# Patient Record
Sex: MALE | URBAN - METROPOLITAN AREA
[De-identification: names, ages, dates, MRNs, and addresses within clinical notes are randomized per-mention and may not be internally consistent; named-entity substitution may affect disease eponyms.]

---

## 2017-08-03 NOTE — PATIENT DISCUSSION
I HAVE EXPLAINED  PLG MIGHT HELP BUT THAT I WOULD PREFER IT HE COULD ACHIEVE ACCEPTABLE VA WITHOUT THE NEED FOR SURGERY.

## 2017-08-03 NOTE — PATIENT DISCUSSION
PT HAVING DIF WITH HIS VISION - PARTICULARLY READING THE PAPER - RECOM BRIGHT LIGHT AND REFRACTION - IF STILL HAVING DIF TO CONSIDER PLG.

## 2018-07-05 ENCOUNTER — IMPORTED ENCOUNTER (OUTPATIENT)
Dept: URBAN - METROPOLITAN AREA CLINIC 9 | Facility: CLINIC | Age: 48
End: 2018-07-05

## 2021-10-16 ASSESSMENT — VISUAL ACUITY
OS_CC: 20/20 SN
OD_SC: 20/20 - SN
OD_CC: 20/20 SN
OS_SC: 20/20 - SN

## 2021-10-16 ASSESSMENT — TONOMETRY
OS_IOP_MMHG: 17
OD_IOP_MMHG: 11

## 2022-06-18 RX ORDER — GABAPENTIN 300 MG/1
1 CAPSULE ORAL
COMMUNITY

## 2023-03-02 NOTE — PATIENT DISCUSSION
ERM does NOT APPEAR VISUALLY SIGNIFICANT. Physician Progress Note      Wil Yan  [facility] #:                  [id number]  :                       1986  ADMIT DATE:       2023 9:58 AM  DISCH DATE:  RESPONDING  PROVIDER #:        Connie Schneider MD          QUERY TEXT:    Pt admitted with symptoms suggestive of viral gastroenteritis. Pt noted to   have tachycardia, leukocytosis, and LEXI. If possible, please document in the   progress notes and discharge summary if you are evaluating and /or treating   any of the following: The medical record reflects the following:  Risk Factors: Presents with 2-day history of nausea, vomiting, diarrhea, and   headache. Recent ill contacts. Clinical Indicators: In the setting of above risk factors, H&P : presented   to the ER with intractable nausea vomiting and diarrhea since last 3 days,   patient denied any fever did have some chills, stated that he was not able to   keep anything down, sick contacts present at work. Patient symptoms likely   suggestive of viral gastroenteritis. Leukocytosis 18 likely reactive. Had mild   LEXI likely prerenal due to poor Po intake and intractable nausea and   vomiting. T-max 99.7. HR max 100. WBCs 18.8 (), 12.9 (3/1). BUN/Cr/GFR:   41/1.30/>60 (), 20/0.86/>60 (3/1). Treatment: 2L Bolus w/ IVFs at 100 ml/hr. Treatment of symptoms. Options provided:  -- Sepsis w/ LEXI secondary to viral gastroenteritis, present on admission  -- SIRS of non-infectious origin w/ LEXI due to dehydration present on   admission  -- Other - I will add my own diagnosis  -- Disagree - Not applicable / Not valid  -- Disagree - Clinically unable to determine / Unknown  -- Refer to Clinical Documentation Reviewer    PROVIDER RESPONSE TEXT:    This patient has SIRS of non-infectious origin w/ LEXI due to dehydration which   was present on admission.     Query created by: Soha Mosley on 3/1/2023 10:59 AM      Electronically signed by:  Connie Schneider MD 3/2/2023 12:35 PM